# Patient Record
Sex: MALE | Race: BLACK OR AFRICAN AMERICAN | NOT HISPANIC OR LATINO | URBAN - METROPOLITAN AREA
[De-identification: names, ages, dates, MRNs, and addresses within clinical notes are randomized per-mention and may not be internally consistent; named-entity substitution may affect disease eponyms.]

---

## 2017-08-11 ENCOUNTER — EMERGENCY (EMERGENCY)
Facility: HOSPITAL | Age: 47
LOS: 1 days | Discharge: HOME | End: 2017-08-11
Attending: EMERGENCY MEDICINE | Admitting: EMERGENCY MEDICINE
Payer: COMMERCIAL

## 2017-08-11 VITALS
TEMPERATURE: 98 F | OXYGEN SATURATION: 100 % | RESPIRATION RATE: 16 BRPM | SYSTOLIC BLOOD PRESSURE: 131 MMHG | HEART RATE: 66 BPM | DIASTOLIC BLOOD PRESSURE: 100 MMHG

## 2017-08-11 VITALS
HEART RATE: 68 BPM | OXYGEN SATURATION: 100 % | SYSTOLIC BLOOD PRESSURE: 126 MMHG | RESPIRATION RATE: 16 BRPM | DIASTOLIC BLOOD PRESSURE: 86 MMHG

## 2017-08-11 PROCEDURE — 99284 EMERGENCY DEPT VISIT MOD MDM: CPT

## 2017-08-11 RX ORDER — KETOROLAC TROMETHAMINE 30 MG/ML
30 SYRINGE (ML) INJECTION ONCE
Qty: 0 | Refills: 0 | Status: DISCONTINUED | OUTPATIENT
Start: 2017-08-11 | End: 2017-08-11

## 2017-08-11 RX ORDER — ACETAMINOPHEN 500 MG
975 TABLET ORAL ONCE
Qty: 0 | Refills: 0 | Status: COMPLETED | OUTPATIENT
Start: 2017-08-11 | End: 2017-08-11

## 2017-08-11 RX ORDER — METHOCARBAMOL 500 MG/1
2 TABLET, FILM COATED ORAL
Qty: 30 | Refills: 0 | OUTPATIENT
Start: 2017-08-11 | End: 2017-08-16

## 2017-08-11 RX ADMIN — Medication 30 MILLIGRAM(S): at 09:40

## 2017-08-11 RX ADMIN — Medication 975 MILLIGRAM(S): at 09:40

## 2017-08-11 NOTE — ED PROVIDER NOTE - MEDICAL DECISION MAKING DETAILS
47 y/o M w/ acute back pain, likely MSK, noted to be hypertensive on triage vitals, will control pain and repeat vitals. If still hypertensive will U/S, but this is likely due to acute pain

## 2017-08-11 NOTE — ED PROVIDER NOTE - PROGRESS NOTE DETAILS
Pain is improved, pt is feeling better. Attending Note (Strasberg): BP improved.  elevation due to acute pain.

## 2017-08-11 NOTE — ED ADULT TRIAGE NOTE - CHIEF COMPLAINT QUOTE
Pt c/o lower back pain after lifting a bin of objects this morning, non-radiating, reproducible. No PMH.

## 2017-08-11 NOTE — ED PROVIDER NOTE - OBJECTIVE STATEMENT
45 y/o M w/ no significant PMHx, presents to the ED c/o  b/l lower back pain after lifting heavy boxes at home this morning. Pt states after lifting boxes immediately had sudden onset of sharp pain b/l non radiating. Took two Motrin w/ no relief. Denies paresthesia, urinary/bowel incontinence or any other complaints. NKDA.

## 2017-08-11 NOTE — ED ADULT NURSE NOTE - OBJECTIVE STATEMENT
pt states he was lifting a heavy object and hurt his back. c.o. pain across lower back. pt ambulatory in area. medicated as ordered.

## 2018-04-23 PROBLEM — Z00.00 ENCOUNTER FOR PREVENTIVE HEALTH EXAMINATION: Status: ACTIVE | Noted: 2018-04-23

## 2018-04-26 ENCOUNTER — APPOINTMENT (OUTPATIENT)
Dept: UROLOGY | Facility: CLINIC | Age: 48
End: 2018-04-26
Payer: COMMERCIAL

## 2018-04-26 PROCEDURE — 99204 OFFICE O/P NEW MOD 45 MIN: CPT

## 2018-04-29 LAB
ALBUMIN SERPL ELPH-MCNC: 4.7 G/DL
ALP BLD-CCNC: 60 U/L
ALT SERPL-CCNC: 13 U/L
ANION GAP SERPL CALC-SCNC: 14 MMOL/L
APPEARANCE: CLEAR
AST SERPL-CCNC: 19 U/L
BACTERIA: NEGATIVE
BASOPHILS # BLD AUTO: 0.06 K/UL
BASOPHILS NFR BLD AUTO: 2 %
BILIRUB SERPL-MCNC: 1.2 MG/DL
BILIRUBIN URINE: NEGATIVE
BLOOD URINE: NEGATIVE
BUN SERPL-MCNC: 14 MG/DL
CALCIUM SERPL-MCNC: 10.1 MG/DL
CHLORIDE SERPL-SCNC: 102 MMOL/L
CHOLEST SERPL-MCNC: 198 MG/DL
CHOLEST/HDLC SERPL: 4.2 RATIO
CO2 SERPL-SCNC: 25 MMOL/L
COLOR: YELLOW
CREAT SERPL-MCNC: 1.03 MG/DL
EOSINOPHIL # BLD AUTO: 0.17 K/UL
EOSINOPHIL NFR BLD AUTO: 5.7 %
ESTRADIOL SERPL-MCNC: 29 PG/ML
FSH SERPL-MCNC: 4.9 IU/L
GLUCOSE QUALITATIVE U: NEGATIVE MG/DL
GLUCOSE SERPL-MCNC: 100 MG/DL
HBA1C MFR BLD HPLC: 4.6 %
HCT VFR BLD CALC: 42.3 %
HDLC SERPL-MCNC: 47 MG/DL
HGB BLD-MCNC: 13.8 G/DL
HYALINE CASTS: 0 /LPF
IMM GRANULOCYTES NFR BLD AUTO: 0.3 %
KETONES URINE: NEGATIVE
LDLC SERPL CALC-MCNC: 138 MG/DL
LEUKOCYTE ESTERASE URINE: NEGATIVE
LH SERPL-ACNC: 5.9 IU/L
LYMPHOCYTES # BLD AUTO: 0.84 K/UL
LYMPHOCYTES NFR BLD AUTO: 28 %
MAN DIFF?: NORMAL
MCHC RBC-ENTMCNC: 32.6 GM/DL
MCHC RBC-ENTMCNC: 33.7 PG
MCV RBC AUTO: 103.2 FL
MICROSCOPIC-UA: NORMAL
MONOCYTES # BLD AUTO: 0.25 K/UL
MONOCYTES NFR BLD AUTO: 8.3 %
NEUTROPHILS # BLD AUTO: 1.67 K/UL
NEUTROPHILS NFR BLD AUTO: 55.7 %
NITRITE URINE: NEGATIVE
PH URINE: 8
PLATELET # BLD AUTO: 257 K/UL
POTASSIUM SERPL-SCNC: 5.8 MMOL/L
PROT SERPL-MCNC: 7.2 G/DL
PROTEIN URINE: NEGATIVE MG/DL
RBC # BLD: 4.1 M/UL
RBC # FLD: 12.5 %
RED BLOOD CELLS URINE: 1 /HPF
SODIUM SERPL-SCNC: 141 MMOL/L
SPECIFIC GRAVITY URINE: 1.02
SQUAMOUS EPITHELIAL CELLS: 0 /HPF
TESTOST BND SERPL-MCNC: 12.1 PG/ML
TESTOST SERPL-MCNC: 544.2 NG/DL
TRIGL SERPL-MCNC: 66 MG/DL
TSH SERPL-ACNC: 1.35 UIU/ML
URINE COMMENTS: NORMAL
UROBILINOGEN URINE: 1 MG/DL
WBC # FLD AUTO: 3 K/UL
WHITE BLOOD CELLS URINE: 0 /HPF

## 2018-07-07 ENCOUNTER — OUTPATIENT (OUTPATIENT)
Dept: OUTPATIENT SERVICES | Facility: HOSPITAL | Age: 48
LOS: 1 days | End: 2018-07-07

## 2018-07-07 VITALS
HEIGHT: 69 IN | SYSTOLIC BLOOD PRESSURE: 120 MMHG | RESPIRATION RATE: 16 BRPM | TEMPERATURE: 98 F | DIASTOLIC BLOOD PRESSURE: 80 MMHG | WEIGHT: 166.01 LBS | HEART RATE: 72 BPM

## 2018-07-07 DIAGNOSIS — N47.1 PHIMOSIS: ICD-10-CM

## 2018-07-07 DIAGNOSIS — Z92.89 PERSONAL HISTORY OF OTHER MEDICAL TREATMENT: Chronic | ICD-10-CM

## 2018-07-07 NOTE — H&P PST ADULT - HISTORY OF PRESENT ILLNESS
47 year old male presents today for presurgical evaluation for ... 47 year old male presents today for presurgical evaluation for Circumcision, Bilateral Vasectomy scheduled on 7/20/18.

## 2018-07-07 NOTE — H&P PST ADULT - PROBLEM SELECTOR PLAN 1
Circumcision, Bilateral Vasectomy scheduled on 7/20/18.   Pre-op instructions provided. Pt verbalized understanding.   Pepcid provided for GI prophylaxis.

## 2018-07-07 NOTE — H&P PST ADULT - NSANTHOSAYNRD_GEN_A_CORE
No. SHAMAR screening performed.  STOP BANG Legend: 0-2 = LOW Risk; 3-4 = INTERMEDIATE Risk; 5-8 = HIGH Risk

## 2018-07-07 NOTE — H&P PST ADULT - FAMILY HISTORY
Mother  Still living? Unknown  Diabetes mellitus, Age at diagnosis: Age Unknown     Father  Still living? Unknown  Family history of lung cancer, Age at diagnosis: Age Unknown

## 2018-07-19 ENCOUNTER — TRANSCRIPTION ENCOUNTER (OUTPATIENT)
Age: 48
End: 2018-07-19

## 2018-07-20 ENCOUNTER — OUTPATIENT (OUTPATIENT)
Dept: OUTPATIENT SERVICES | Facility: HOSPITAL | Age: 48
LOS: 1 days | Discharge: HOME | End: 2018-07-20
Payer: COMMERCIAL

## 2018-07-20 ENCOUNTER — APPOINTMENT (OUTPATIENT)
Dept: UROLOGY | Facility: AMBULATORY SURGERY CENTER | Age: 48
End: 2018-07-20

## 2018-07-20 ENCOUNTER — RESULT REVIEW (OUTPATIENT)
Age: 48
End: 2018-07-20

## 2018-07-20 VITALS
WEIGHT: 166.01 LBS | DIASTOLIC BLOOD PRESSURE: 92 MMHG | TEMPERATURE: 98 F | OXYGEN SATURATION: 98 % | RESPIRATION RATE: 16 BRPM | HEART RATE: 64 BPM | SYSTOLIC BLOOD PRESSURE: 131 MMHG | HEIGHT: 69 IN

## 2018-07-20 VITALS — OXYGEN SATURATION: 99 % | HEART RATE: 75 BPM | SYSTOLIC BLOOD PRESSURE: 123 MMHG

## 2018-07-20 DIAGNOSIS — Z92.89 PERSONAL HISTORY OF OTHER MEDICAL TREATMENT: Chronic | ICD-10-CM

## 2018-07-20 DIAGNOSIS — N47.1 PHIMOSIS: ICD-10-CM

## 2018-07-20 PROCEDURE — 54161 CIRCUM 28 DAYS OR OLDER: CPT

## 2018-07-20 PROCEDURE — 88304 TISSUE EXAM BY PATHOLOGIST: CPT | Mod: 26

## 2018-07-20 PROCEDURE — 55250 REMOVAL OF SPERM DUCT(S): CPT

## 2018-07-20 PROCEDURE — 54164 FRENULOTOMY OF PENIS: CPT | Mod: 59

## 2018-07-20 RX ORDER — CELECOXIB 200 MG/1
1 CAPSULE ORAL
Qty: 8 | Refills: 0 | OUTPATIENT
Start: 2018-07-20 | End: 2018-07-23

## 2018-07-20 NOTE — ASU DISCHARGE PLAN (ADULT/PEDIATRIC). - BATHING
SHowers ok in 36 hours, no baths for 2 weeks./shower only SHowers ok in 36 hours, no tub  baths for 2 weeks./shower only

## 2018-07-20 NOTE — ASU DISCHARGE PLAN (ADULT/PEDIATRIC). - ACTIVITY LEVEL
No sex or masturbation for 4 weeks. Nothing strenuous for next 1-2 days./no intercourse/no exercise/no heavy lifting

## 2018-07-20 NOTE — ASU DISCHARGE PLAN (ADULT/PEDIATRIC). - MEDICATION SUMMARY - MEDICATIONS TO TAKE
I will START or STAY ON the medications listed below when I get home from the hospital:    see medication reconciliation sheet  -- Indication: For Phimosis

## 2018-07-20 NOTE — ASU DISCHARGE PLAN (ADULT/PEDIATRIC). - NOTIFY
Swelling that continues/Persistent Nausea and Vomiting/Bleeding that does not stop/Numbness, color, or temperature change to extremity/Pain not relieved by Medications/Fever greater than 101

## 2018-07-24 LAB — SURGICAL PATHOLOGY STUDY: SIGNIFICANT CHANGE UP

## 2018-08-07 ENCOUNTER — APPOINTMENT (OUTPATIENT)
Dept: UROLOGY | Facility: CLINIC | Age: 48
End: 2018-08-07
Payer: COMMERCIAL

## 2018-08-07 DIAGNOSIS — N47.1 PHIMOSIS: ICD-10-CM

## 2018-08-07 DIAGNOSIS — Z30.09 ENCOUNTER FOR OTHER GENERAL COUNSELING AND ADVICE ON CONTRACEPTION: ICD-10-CM

## 2018-08-07 PROCEDURE — 99024 POSTOP FOLLOW-UP VISIT: CPT

## 2018-08-14 ENCOUNTER — TRANSCRIPTION ENCOUNTER (OUTPATIENT)
Age: 48
End: 2018-08-14

## 2018-10-16 ENCOUNTER — OTHER (OUTPATIENT)
Age: 48
End: 2018-10-16

## 2018-10-16 ENCOUNTER — APPOINTMENT (OUTPATIENT)
Dept: UROLOGY | Facility: CLINIC | Age: 48
End: 2018-10-16

## 2019-06-09 PROBLEM — N47.1 PHIMOSIS: Status: ACTIVE | Noted: 2018-04-26
